# Patient Record
Sex: FEMALE | Race: WHITE | Employment: UNEMPLOYED | URBAN - METROPOLITAN AREA
[De-identification: names, ages, dates, MRNs, and addresses within clinical notes are randomized per-mention and may not be internally consistent; named-entity substitution may affect disease eponyms.]

---

## 2017-12-05 ENCOUNTER — HOSPITAL ENCOUNTER (OUTPATIENT)
Dept: MAMMOGRAPHY | Age: 72
Discharge: HOME | End: 2017-12-05
Payer: MEDICARE

## 2017-12-05 DIAGNOSIS — Z12.31 VISIT FOR SCREENING MAMMOGRAM: ICD-10-CM

## 2017-12-05 PROCEDURE — 77063 BREAST TOMOSYNTHESIS BI: CPT

## 2018-01-23 ENCOUNTER — OFFICE VISIT (OUTPATIENT)
Dept: INFECTIOUS DISEASES | Age: 73
End: 2018-01-23
Payer: MEDICARE

## 2018-01-23 VITALS
DIASTOLIC BLOOD PRESSURE: 81 MMHG | RESPIRATION RATE: 15 BRPM | SYSTOLIC BLOOD PRESSURE: 129 MMHG | OXYGEN SATURATION: 94 % | WEIGHT: 158 LBS | TEMPERATURE: 97.6 F | HEART RATE: 70 BPM | HEIGHT: 64 IN | BODY MASS INDEX: 26.98 KG/M2

## 2018-01-23 DIAGNOSIS — M79.671 RIGHT FOOT PAIN: ICD-10-CM

## 2018-01-23 DIAGNOSIS — M86.9 TOE OSTEOMYELITIS (HCC): Primary | ICD-10-CM

## 2018-01-23 PROCEDURE — 1123F ACP DISCUSS/DSCN MKR DOCD: CPT | Performed by: INTERNAL MEDICINE

## 2018-01-23 PROCEDURE — 4040F PNEUMOC VAC/ADMIN/RCVD: CPT | Performed by: INTERNAL MEDICINE

## 2018-01-23 PROCEDURE — 3017F COLORECTAL CA SCREEN DOC REV: CPT | Performed by: INTERNAL MEDICINE

## 2018-01-23 PROCEDURE — 99204 OFFICE O/P NEW MOD 45 MIN: CPT | Performed by: INTERNAL MEDICINE

## 2018-01-23 PROCEDURE — 3014F SCREEN MAMMO DOC REV: CPT | Performed by: INTERNAL MEDICINE

## 2018-01-23 PROCEDURE — G8484 FLU IMMUNIZE NO ADMIN: HCPCS | Performed by: INTERNAL MEDICINE

## 2018-01-23 PROCEDURE — G8427 DOCREV CUR MEDS BY ELIG CLIN: HCPCS | Performed by: INTERNAL MEDICINE

## 2018-01-23 PROCEDURE — G8419 CALC BMI OUT NRM PARAM NOF/U: HCPCS | Performed by: INTERNAL MEDICINE

## 2018-01-23 PROCEDURE — G8399 PT W/DXA RESULTS DOCUMENT: HCPCS | Performed by: INTERNAL MEDICINE

## 2018-01-23 PROCEDURE — 1036F TOBACCO NON-USER: CPT | Performed by: INTERNAL MEDICINE

## 2018-01-23 PROCEDURE — 1090F PRES/ABSN URINE INCON ASSESS: CPT | Performed by: INTERNAL MEDICINE

## 2018-01-23 RX ORDER — MELOXICAM 15 MG/1
15 TABLET ORAL DAILY
COMMUNITY

## 2018-01-23 RX ORDER — MULTIVIT,TX WITH IRON,MINERALS
500 TABLET, EXTENDED RELEASE ORAL DAILY
COMMUNITY

## 2018-01-23 RX ORDER — CLOTRIMAZOLE 1 %
CREAM (GRAM) TOPICAL 2 TIMES DAILY
COMMUNITY

## 2018-01-23 RX ORDER — CARVEDILOL 12.5 MG/1
12.5 TABLET ORAL 2 TIMES DAILY WITH MEALS
COMMUNITY

## 2018-01-23 RX ORDER — ZOLPIDEM TARTRATE 10 MG/1
TABLET ORAL NIGHTLY PRN
COMMUNITY

## 2018-01-23 RX ORDER — HYDROCODONE BITARTRATE AND ACETAMINOPHEN 5; 325 MG/1; MG/1
1 TABLET ORAL EVERY 6 HOURS PRN
COMMUNITY

## 2018-01-23 RX ORDER — DULOXETIN HYDROCHLORIDE 60 MG/1
60 CAPSULE, DELAYED RELEASE ORAL DAILY
COMMUNITY

## 2018-01-23 RX ORDER — ROSUVASTATIN CALCIUM 10 MG/1
10 TABLET, COATED ORAL DAILY
COMMUNITY

## 2018-01-23 RX ORDER — M-VIT,TX,IRON,MINS/CALC/FOLIC 27MG-0.4MG
1 TABLET ORAL DAILY
COMMUNITY

## 2018-01-23 RX ORDER — PHENOL 1.4 %
1 AEROSOL, SPRAY (ML) MUCOUS MEMBRANE DAILY
COMMUNITY

## 2018-01-23 RX ORDER — CILOSTAZOL 50 MG/1
50 TABLET ORAL 2 TIMES DAILY
COMMUNITY

## 2018-01-23 ASSESSMENT — ENCOUNTER SYMPTOMS
COUGH: 0
SINUS PRESSURE: 0
EYE DISCHARGE: 0
SINUS PAIN: 0
RHINORRHEA: 0
DIARRHEA: 0
SHORTNESS OF BREATH: 0
SORE THROAT: 0
ABDOMINAL DISTENTION: 0
ABDOMINAL PAIN: 0
BACK PAIN: 0
VOICE CHANGE: 0
FACIAL SWELLING: 0
TROUBLE SWALLOWING: 0
NAUSEA: 0

## 2018-01-23 NOTE — PROGRESS NOTES
[facility] INFECTIOUS DISEASES  Consult Note      Patient's Name: Lu Sampson  YOB: 1945  Age/Sex: 67 y.o./ female  Physician: Akil Bryson MD  Date of Consult: 1/23/2018     Reason for Consult:  ***    History Obtained From:  {HISTORY SOURCE:[id number]::\"patient\"}      IMPRESSION:  ·       RECOMMENDATIONS:  ·             CHIEF COMPLAINT:  ***    HISTORY OF PRESENT ILLNESS:                The patient is a 67 y.o. female with significant past medical history of *** who presents with ***    Past Medical History:        Diagnosis Date    Osteoarthritis     Osteomyelitis ([facility] [address]        Past Surgical History:        Procedure Laterality Date    BREAST LUMPECTOMY Right        Current Medications:    Current Outpatient Prescriptions   Medication Sig Dispense Refill    carvedilol (COREG) 12.5 MG tablet Take 12.5 mg by mouth 2 times daily (with meals)      calcium carbonate 600 MG TABS tablet Take 1 tablet by mouth daily      cilostazol (PLETAL) 50 MG tablet Take 50 mg by mouth 2 times daily      clotrimazole (LOTRIMIN) 1 % cream Apply topically 2 times daily Apply topically 2 times daily.  DULoxetine (CYMBALTA) 60 MG extended release capsule Take 60 mg by mouth daily      HYDROcodone-acetaminophen (NORCO) 5-325 MG per tablet Take 1 tablet by mouth every 6 hours as needed for Pain.  Magnesium Gluconate 250 MG TABS Take 500 mg by mouth daily      meloxicam (MOBIC) 15 MG tablet Take 15 mg by mouth daily      metFORMIN (GLUCOPHAGE) 500 MG tablet Take 500 mg by mouth 2 times daily (with meals)      Multiple Vitamins-Minerals (THERAPEUTIC MULTIVITAMIN-MINERALS) tablet Take 1 tablet by mouth daily      rosuvastatin (CRESTOR) 10 MG tablet Take 10 mg by mouth daily      Cholecalciferol (VITAMIN D3) 5000 units TABS Take by mouth      zolpidem (AMBIEN) 10 MG tablet Take by mouth nightly as needed for Sleep. No current facility-administered medications for this visit.         Allergies: positive ANCA test result is not a definitive indicator of ANCA-associated   vasculitis. Diagnosis and treatment decisions should be based on the clinical   presentation of the patient and histologic findings and not on the results of   ANCA testing alone. A minority of patients with clinicopathologic features of a small vessel   vasculitis syndrome are ANCA negative, and a negative ANCA should not be used   to exclude disease. Anti-PR3 reactivity is most often seen in active Wegener's granulomatosis or   polyarteritis nodosa but may be seen in idiopathic crescentic   glomerulonephritis or Churg-Lauren syndrome. Anti-MPO reactivity                            is most often seen in polyarteritis nodosa, idiopathic   crescentic glomerulonephritis, or Churg-Lauren syndrome. However, it may be   seen in Wegener's granulomatosis. The demonstration of ANCA may be useful in the diagnosis of inflammatory bowel   disease, primary sclerosing cholangitis, autoimmune hepatitis type I and   Felty's syndrome. ANCA can also be associated with some infections (eg. Tuberculosis), some drug   exposures, active rheumatoid arthritis and Kawasaki disease. Non-vasculitic   conditions associated with ANCA must be considered and ruled out in patients in   whom vasculitis is suspected. Performed at [address] [phone]     }          Microbiology:      Imaging:          Problem List:  There is no problem list on file for this patient. Thank you.     Sachi Reza MD  1/23/2018  2:38 PM  Infectious Disease Medicine

## 2018-01-23 NOTE — LETTER
 Alpha 2 % 11/18/2015 14* 6 - 13 % Final    Beta Globulin 11/18/2015 0.9  0.7 - 1.4 g/dL Final    Beta Percent 11/18/2015 13  11 - 19 % Final    Gamma Globulin 11/18/2015 1.1  0.5 - 1.5 g/dL Final    Gamma Globulin % 11/18/2015 16  9 - 20 % Final    Total Prot. Sum 11/18/2015 6.7  6.3 - 8.2 g/dL Final    Total Prot. Sum,% 11/18/2015 100  98 - 102 % Final    Protein Electrophoresis, Serum 11/18/2015 A ZONE OF RESTRICTION IS PRESENT IN THE GAMMAGLOBULIN REGION. CONFIRMED BY   Final    Comment:  IMMUNOFIXATION TO BE MONOCLONAL  IgM,  Kappa. THE APPROXIMATE DENSITOMETRIC QUANTITATION OF PARAPROTEIN IS  0.38 G/DL.  Pathologist 11/18/2015 ELECTRONICALLY SIGNED. Jena Amador M.D. Final    Rheumatoid Factor 11/17/2015 <10  <14 IU/mL Final    Sed Rate 11/17/2015 30* 0 - 20 mm Final    Anti-Yo (Purkinje Cell) 11/26/2015 None Detected  None Detected Final    Comment: (NOTE)  CANELO-1, CANELO-2 or PCA-1 antibodies not detected, confirmatory  testing for Hu (CANELO-1), Ri (CNAELO-2) or Yo (PCA-1) IgG antibodies  will not be performed. INTERPRETIVE INFORMATION: Purkinje Cell/Neuronal Nuclear IgG Scrn  Test developed and characteristics determined by [facility]. See Compliance Statement D: [employer]/  Performed by [facility],  [place] [address], [address] [facility] [phone]  www. Elizabeth Crowder MD, Lab.  Director  Performed at [facility] [address], [address] [facility] [phone]      ANCA Myeloperoxidase 11/18/2015 9  <100 AU/mL Final    Comment:                                                   Reference Ranges (MPO and PR3):    <100    AU/mL      Negative    100-120 AU/mL      Equivocal    >120    AU/mL      Positive                                                        ANCA Proteinase 3 11/18/2015 9  <100 AU/mL Final    Comment:                                                   Reference Ranges (MPO and PR3):    <100    AU/mL      Negative 100-120 AU/mL      Equivocal    >120    AU/mL      Positive                                                        ANCA Reference Range: 11/18/2015 Interpretation of antineutrophil cytoplasmic autoantibody (ANCA) test results   Final    Comment:  requires consideration of several factors. ANCA tests are most valuable when   selectively ordered in clinical situations where an ANCA-associated small   vessel vasculitis is a serious consideration, and should not be used as a   screening test.    A positive ANCA test result is not a definitive indicator of ANCA-associated   vasculitis. Diagnosis and treatment decisions should be based on the clinical   presentation of the patient and histologic findings and not on the results of   ANCA testing alone. A minority of patients with clinicopathologic features of a small vessel   vasculitis syndrome are ANCA negative, and a negative ANCA should not be used   to exclude disease. Anti-PR3 reactivity is most often seen in active Wegener's granulomatosis or   polyarteritis nodosa but may be seen in idiopathic crescentic   glomerulonephritis or Churg-Lauren syndrome. Anti-MPO reactivity                            is most often seen in polyarteritis nodosa, idiopathic   crescentic glomerulonephritis, or Churg-Lauren syndrome. However, it may be   seen in Wegener's granulomatosis. The demonstration of ANCA may be useful in the diagnosis of inflammatory bowel   disease, primary sclerosing cholangitis, autoimmune hepatitis type I and   Felty's syndrome. ANCA can also be associated with some infections (eg. Tuberculosis), some drug   exposures, active rheumatoid arthritis and Kawasaki disease. Non-vasculitic   conditions associated with ANCA must be considered and ruled out in patients in   whom vasculitis is suspected. ended up with an ulceration to the hospital couple of months ago that intermittently drain some clear fluid. She has not been given any antibiotics. Surprisingly, both her sed rate and CRP have been trending down without any treatment. He still is bothersome to the right fifth toe and she is able to ambulate and will infiltrate. She has body aches related to fibromyalgia. She denies changes in her weight or appetite. She denies having any headaches changes in her vision sinus sympt. No rash dysphagia or oral ulcers chest pain or abdominal pain or change in her bowel or bladder function. The patient has had acrocyanosis in the past year or so. She was also treated recently for lymphoplasmacytic lymphoma. Dr. Any Benjamin. There was theory about protein deposition in the organs and tissue per Dr Any Benjamin but we don't have his notes on this. 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 History of treatment with Dr. Any Benjamin (he left town). Past Surgical History:        Procedure Laterality Date    BREAST LUMPECTOMY Right    Status post cataract surgery  Tonsillectomy and adenoidectomy  Left wrist ORIF  Left Achilles tendon repair.     Current Medications:    Current Outpatient Prescriptions   Medication Sig Dispense Refill    carvedilol (COREG) 12.5 MG tablet Take 12.5 mg by mouth 2 times daily (with meals)      calcium carbonate 600 MG TABS tablet Take 1 tablet by mouth daily      cilostazol (PLETAL) 50 MG tablet Take 50 mg by mouth 2 times daily      clotrimazole (LOTRIMIN) 1 % cream Apply topically 2 times daily Apply She has no cervical adenopathy. Neurological: She is alert and oriented to person, place, and time. Skin: She is not diaphoretic. No pallor. Psychiatric: She has a normal mood and affect. Her behavior is normal. Judgment and thought content normal.       Labs:    11/17  ANCA 9 - negative  Anti- Yo none detected  ESR 30  CRP 1.1  RF 10  SPEP  Alpha 2 14 restriction gamma region    No visits with results within 2 Month(s) from this visit. Latest known visit with results is:   Hospital Outpatient Visit on 11/17/2015   Component Date Value Ref Range Status    CRP 11/17/2015 1.1  0.0 - 5.0 mg/L Final    Serum IFX Interp 11/18/2015 A ZONE OF RESTRICTION IS PRESENT IN THE GAMMAGLOBULIN REGION. CONFIRMED BY   Final    Comment:  IMMUNOFIXATION TO BE MONOCLONAL  IgM,  Kappa. THE APPROXIMATE DENSITOMETRIC QUANTITATION OF PARAPROTEIN IS  0.38 G/DL.  Pathologist 11/18/2015 ELECTRONICALLY SIGNED. Celsa Rosas M.D. Final    Total Protein 11/18/2015 6.6  6.4 - 8.3 g/dL Final    Albumin (calculated) 11/18/2015 3.6  3.2 - 5.2 g/dL Final    Albumin % 11/18/2015 54  45 - 65 % Final    Alpha-1-Globulin 11/18/2015 0.2  0.1 - 0.4 g/dL Final    Alpha 1 % 11/18/2015 3  3 - 6 % Final    Alpha-2-Globulin 11/18/2015 0.9  0.5 - 0.9 g/dL Final    Alpha 2 % 11/18/2015 14* 6 - 13 % Final    Beta Globulin 11/18/2015 0.9  0.7 - 1.4 g/dL Final    Beta Percent 11/18/2015 13  11 - 19 % Final    Gamma Globulin 11/18/2015 1.1  0.5 - 1.5 g/dL Final    Gamma Globulin % 11/18/2015 16  9 - 20 % Final    Total Prot. Sum 11/18/2015 6.7  6.3 - 8.2 g/dL Final    Total Prot. Sum,% 11/18/2015 100  98 - 102 % Final    Protein Electrophoresis, Serum 11/18/2015 A ZONE OF RESTRICTION IS PRESENT IN THE GAMMAGLOBULIN REGION. CONFIRMED BY   Final    Comment:  IMMUNOFIXATION TO BE MONOCLONAL  IgM,  Kappa. THE APPROXIMATE DENSITOMETRIC QUANTITATION OF PARAPROTEIN IS  0.38 G/DL.  Pathologist 11/18/2015 ELECTRONICALLY SIGNED. Maryan Chris M.D. Final    Rheumatoid Factor 11/17/2015 <10  <14 IU/mL Final    Sed Rate 11/17/2015 30* 0 - 20 mm Final    Anti-Yo (Purkinje Cell) 11/26/2015 None Detected  None Detected Final    Comment: (NOTE)  CANELO-1, CANELO-2 or PCA-1 antibodies not detected, confirmatory  testing for Hu (CANELO-1), Ri (CANELO-2) or Yo (PCA-1) IgG antibodies  will not be performed. INTERPRETIVE INFORMATION: Purkinje Cell/Neuronal Nuclear IgG Scrn  Test developed and characteristics determined by [facility]. See Compliance Statement D: [employer]/CS  Performed by [facility],  [place] [address], [address] [facility] [phone]  www. Quentin Barbosa MD, Lab. Director  Performed at [facility] [address], [address] [facility] [phone]      ANCA Myeloperoxidase 11/18/2015 9  <100 AU/mL Final    Comment:                                                   Reference Ranges (MPO and PR3):    <100    AU/mL      Negative    100-120 AU/mL      Equivocal    >120    AU/mL      Positive                                                        ANCA Proteinase 3 11/18/2015 9  <100 AU/mL Final    Comment:                                                   Reference Ranges (MPO and PR3):    <100    AU/mL      Negative    100-120 AU/mL      Equivocal    >120    AU/mL      Positive                                                        ANCA Reference Range: 11/18/2015 Interpretation of antineutrophil cytoplasmic autoantibody (ANCA) test results   Final    Comment:  requires consideration of several factors.   ANCA tests are most valuable when   selectively ordered in clinical situations where an ANCA-associated small   vessel vasculitis is a serious consideration, and should not be used as a   screening test.    A positive ANCA test result is not a definitive indicator of ANCA-associated

## 2018-01-23 NOTE — LETTER
 Alpha 2 % 11/18/2015 14* 6 - 13 % Final    Beta Globulin 11/18/2015 0.9  0.7 - 1.4 g/dL Final    Beta Percent 11/18/2015 13  11 - 19 % Final    Gamma Globulin 11/18/2015 1.1  0.5 - 1.5 g/dL Final    Gamma Globulin % 11/18/2015 16  9 - 20 % Final    Total Prot. Sum 11/18/2015 6.7  6.3 - 8.2 g/dL Final    Total Prot. Sum,% 11/18/2015 100  98 - 102 % Final    Protein Electrophoresis, Serum 11/18/2015 A ZONE OF RESTRICTION IS PRESENT IN THE GAMMAGLOBULIN REGION. CONFIRMED BY   Final    Comment:  IMMUNOFIXATION TO BE MONOCLONAL  IgM,  Kappa. THE APPROXIMATE DENSITOMETRIC QUANTITATION OF PARAPROTEIN IS  0.38 G/DL.  Pathologist 11/18/2015 ELECTRONICALLY SIGNED. Eugenia Bumpers, M.D. Final    Rheumatoid Factor 11/17/2015 <10  <14 IU/mL Final    Sed Rate 11/17/2015 30* 0 - 20 mm Final    Anti-Yo (Purkinje Cell) 11/26/2015 None Detected  None Detected Final    Comment: (NOTE)  CANELO-1, CANELO-2 or PCA-1 antibodies not detected, confirmatory  testing for Hu (CANELO-1), Ri (CANELO-2) or Yo (PCA-1) IgG antibodies  will not be performed. INTERPRETIVE INFORMATION: Purkinje Cell/Neuronal Nuclear IgG Scrn  Test developed and characteristics determined by [employer]. See Compliance Statement D: [employer]/  Performed by [facility] [facility],  [place] [address], [address] [facility] [phone]  www. Parker Krishna MD, Lab.  Director  Performed at [facility] [address], [address] [facility] [phone]      ANCA Myeloperoxidase 11/18/2015 9  <100 AU/mL Final    Comment:                                                   Reference Ranges (MPO and PR3):    <100    AU/mL      Negative    100-120 AU/mL      Equivocal    >120    AU/mL      Positive                                                        ANCA Proteinase 3 11/18/2015 9  <100 AU/mL Final    Comment:                                                   Reference Ranges (MPO and PR3):    <100    AU/mL      Negative ulcerations of both feet for the past 2 years and this interview. She ended up with an ulceration to the hospital couple of months ago that intermittently drain some clear fluid. She has not been given any antibiotics. Surprisingly, both her sed rate and CRP have been trending down without any treatment. He still is bothersome to the right fifth toe and she is able to ambulate and will infiltrate. She has body aches related to fibromyalgia. She denies changes in her weight or appetite. She denies having any headaches changes in her vision sinus sympt. No rash dysphagia or oral ulcers chest pain or abdominal pain or change in her bowel or bladder function. The patient has had acrocyanosis in the past year or so. She was also treated recently for lymphoplasmacytic lymphoma. Dr. Lucio Rai. There was theory about protein deposition in the organs and tissue per Dr Lucio Rai but we don't have his notes on this. 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 History of treatment with Dr. Lucio Rai (he left town). Osteoarthritis  5th toe osteomyelitis, right    Past Surgical History:    Status post cataract surgery  Tonsillectomy and adenoidectomy  Left wrist ORIF  Left Achilles tendon repair. Right breast lumpectomy.     Current Medications:    Current Outpatient Prescriptions   Medication Sig Dispense Refill    carvedilol (COREG) 12.5 MG tablet Take 12.5 mg by mouth 2 times daily (with meals)      calcium carbonate 600 MG TABS tablet Take 1 tablet by mouth daily      cilostazol (PLETAL) 50 MG tablet Take 50 mg by mouth 2 times daily THE APPROXIMATE DENSITOMETRIC QUANTITATION OF PARAPROTEIN IS  0.38 G/DL.  Pathologist 11/18/2015 ELECTRONICALLY SIGNED. Eugenia Bumpers, M.D. Final    Rheumatoid Factor 11/17/2015 <10  <14 IU/mL Final    Sed Rate 11/17/2015 30* 0 - 20 mm Final    Anti-Yo (Purkinje Cell) 11/26/2015 None Detected  None Detected Final    Comment: (NOTE)  CANELO-1, CANELO-2 or PCA-1 antibodies not detected, confirmatory  testing for Hu (CANELO-1), Ri (CANELO-2) or Yo (PCA-1) IgG antibodies  will not be performed. INTERPRETIVE INFORMATION: Purkinje Cell/Neuronal Nuclear IgG Scrn  Test developed and characteristics determined by [employer]. See Compliance Statement D: [employer]/CS  Performed by [facility],  [place] [address], [address] [facility] [phone]  www. Parker Krishna MD, Lab. Director  Performed at [facility] [address] [facility], [address] [facility] [phone]      ANCA Myeloperoxidase 11/18/2015 9  <100 AU/mL Final    Comment:                                                   Reference Ranges (MPO and PR3):    <100    AU/mL      Negative    100-120 AU/mL      Equivocal    >120    AU/mL      Positive                                                        ANCA Proteinase 3 11/18/2015 9  <100 AU/mL Final    Comment:                                                   Reference Ranges (MPO and PR3):    <100    AU/mL      Negative    100-120 AU/mL      Equivocal    >120    AU/mL      Positive                                                        ANCA Reference Range: 11/18/2015 Interpretation of antineutrophil cytoplasmic autoantibody (ANCA) test results   Final    Comment:  requires consideration of several factors.   ANCA tests are most valuable when   selectively ordered in clinical situations where an ANCA-associated small   vessel vasculitis is a serious consideration, and should not be used as a   screening test.    A positive ANCA test result is not a definitive indicator of

## 2018-01-23 NOTE — PROGRESS NOTES
allergies. Neurological: Negative for dizziness, tremors, seizures, syncope, facial asymmetry, speech difficulty, weakness, light-headedness, numbness and headaches. Hematological: Negative for adenopathy. Does not bruise/bleed easily. Psychiatric/Behavioral: Negative for agitation. Vitals:    /81 (Site: Right Arm, Position: Sitting)   Pulse 70   Temp 97.6 °F (36.4 °C)   Resp 15   Ht 5' 3.5\" (1.613 m)   Wt 158 lb (71.7 kg)   SpO2 94% Comment: Room air  BMI 27.55 kg/m²     PHYSICAL EXAM:    Physical Exam   Constitutional: She is oriented to person, place, and time. She appears well-developed and well-nourished. No distress. Seen with    HENT:   Head: Normocephalic and atraumatic. Mouth/Throat: Oropharynx is clear and moist.   Eyes: Conjunctivae and EOM are normal. Pupils are equal, round, and reactive to light. Right eye exhibits no discharge. Left eye exhibits no discharge. No scleral icterus. Neck: Neck supple. No JVD present. No tracheal deviation present. No thyromegaly present. Cardiovascular: Normal rate, regular rhythm, normal heart sounds and intact distal pulses. No murmur heard. Pulmonary/Chest: Effort normal and breath sounds normal. No stridor. No respiratory distress. She has no wheezes. She has no rales. Abdominal: Soft. Bowel sounds are normal. She exhibits no distension. There is no tenderness. Musculoskeletal: She exhibits no edema. Some cyanosis of some digits that blanches on pressure  Acrocyanosis of ankle to lower legs noticeable. Right 5th toe mildly swollen with dry scab and some mild tenderness on pressure. Lymphadenopathy:     She has no cervical adenopathy. Neurological: She is alert and oriented to person, place, and time. Skin: She is not diaphoretic. No pallor. Psychiatric: She has a normal mood and affect.  Her behavior is normal. Judgment and thought content normal.       Labs:    11/17  ANCA 9 - negative  Anti- Yo none

## 2018-02-07 DIAGNOSIS — M79.671 RIGHT FOOT PAIN: ICD-10-CM

## 2018-02-07 DIAGNOSIS — M86.9 TOE OSTEOMYELITIS (HCC): ICD-10-CM

## 2018-02-26 DIAGNOSIS — M86.9 TOE OSTEOMYELITIS (HCC): ICD-10-CM

## 2018-02-26 DIAGNOSIS — M79.671 RIGHT FOOT PAIN: ICD-10-CM

## 2018-03-20 ENCOUNTER — OFFICE VISIT (OUTPATIENT)
Dept: INFECTIOUS DISEASES | Age: 73
End: 2018-03-20
Payer: MEDICARE

## 2018-03-20 VITALS
RESPIRATION RATE: 15 BRPM | WEIGHT: 152.8 LBS | OXYGEN SATURATION: 95 % | BODY MASS INDEX: 26.09 KG/M2 | SYSTOLIC BLOOD PRESSURE: 111 MMHG | DIASTOLIC BLOOD PRESSURE: 65 MMHG | HEIGHT: 64 IN | HEART RATE: 80 BPM | TEMPERATURE: 97.6 F

## 2018-03-20 DIAGNOSIS — M86.671 OTHER CHRONIC OSTEOMYELITIS OF RIGHT FOOT (HCC): Primary | ICD-10-CM

## 2018-03-20 PROCEDURE — 1090F PRES/ABSN URINE INCON ASSESS: CPT | Performed by: INTERNAL MEDICINE

## 2018-03-20 PROCEDURE — 1123F ACP DISCUSS/DSCN MKR DOCD: CPT | Performed by: INTERNAL MEDICINE

## 2018-03-20 PROCEDURE — 3017F COLORECTAL CA SCREEN DOC REV: CPT | Performed by: INTERNAL MEDICINE

## 2018-03-20 PROCEDURE — 1036F TOBACCO NON-USER: CPT | Performed by: INTERNAL MEDICINE

## 2018-03-20 PROCEDURE — G8427 DOCREV CUR MEDS BY ELIG CLIN: HCPCS | Performed by: INTERNAL MEDICINE

## 2018-03-20 PROCEDURE — G8399 PT W/DXA RESULTS DOCUMENT: HCPCS | Performed by: INTERNAL MEDICINE

## 2018-03-20 PROCEDURE — 4040F PNEUMOC VAC/ADMIN/RCVD: CPT | Performed by: INTERNAL MEDICINE

## 2018-03-20 PROCEDURE — G8419 CALC BMI OUT NRM PARAM NOF/U: HCPCS | Performed by: INTERNAL MEDICINE

## 2018-03-20 PROCEDURE — G8484 FLU IMMUNIZE NO ADMIN: HCPCS | Performed by: INTERNAL MEDICINE

## 2018-03-20 PROCEDURE — 99214 OFFICE O/P EST MOD 30 MIN: CPT | Performed by: INTERNAL MEDICINE

## 2018-03-20 PROCEDURE — 3014F SCREEN MAMMO DOC REV: CPT | Performed by: INTERNAL MEDICINE

## 2018-03-20 RX ORDER — PENTOXIFYLLINE 400 MG/1
1 TABLET, EXTENDED RELEASE ORAL DAILY
COMMUNITY
Start: 2018-01-29

## 2018-03-20 ASSESSMENT — ENCOUNTER SYMPTOMS
ABDOMINAL PAIN: 0
VOICE CHANGE: 0
DIARRHEA: 0
COUGH: 0
SORE THROAT: 0
ABDOMINAL DISTENTION: 0
SHORTNESS OF BREATH: 0
NAUSEA: 0
RHINORRHEA: 0
BACK PAIN: 0
EYE DISCHARGE: 0
TROUBLE SWALLOWING: 0
FACIAL SWELLING: 0

## 2018-03-28 ASSESSMENT — ENCOUNTER SYMPTOMS
SINUS PRESSURE: 0
CONSTIPATION: 0
BLOOD IN STOOL: 0
ANAL BLEEDING: 0
SINUS PAIN: 0

## 2018-04-19 ASSESSMENT — ENCOUNTER SYMPTOMS
CHEST TIGHTNESS: 0
PHOTOPHOBIA: 0
STRIDOR: 0
EYE REDNESS: 0
CHOKING: 0
COLOR CHANGE: 0